# Patient Record
Sex: FEMALE | Race: WHITE
[De-identification: names, ages, dates, MRNs, and addresses within clinical notes are randomized per-mention and may not be internally consistent; named-entity substitution may affect disease eponyms.]

---

## 2022-09-16 ENCOUNTER — HOSPITAL ENCOUNTER (OUTPATIENT)
Dept: HOSPITAL 47 - RADUSWWP | Age: 52
Discharge: HOME | End: 2022-09-16
Attending: FAMILY MEDICINE
Payer: COMMERCIAL

## 2022-09-16 DIAGNOSIS — I88.9: Primary | ICD-10-CM

## 2022-09-16 PROCEDURE — 76536 US EXAM OF HEAD AND NECK: CPT

## 2022-09-16 NOTE — US
EXAMINATION TYPE: US st tissue head/neck

 

DATE OF EXAM: 9/16/2022

 

COMPARISON: NONE

 

CLINICAL HISTORY: I88.9 NONSPECIFIC LYMPHADENITIS. Left neck palpable mass for 5 months.

 

In the area of palpable mass, left posterior neck, an anechoic well circumscribed mass is visualized 
measuring 0.86 x 0.45 x 0.72 cm. This mass in non vascular. 

 

IMPRESSION:  

1. Cystlike area within the posterior left neck.